# Patient Record
Sex: FEMALE | Race: WHITE | NOT HISPANIC OR LATINO | URBAN - METROPOLITAN AREA
[De-identification: names, ages, dates, MRNs, and addresses within clinical notes are randomized per-mention and may not be internally consistent; named-entity substitution may affect disease eponyms.]

---

## 2018-06-13 ENCOUNTER — TELEPHONE (OUTPATIENT)
Dept: SOCIAL WORK | Facility: HOSPITAL | Age: 48
End: 2018-06-13

## 2018-06-13 NOTE — TELEPHONE ENCOUNTER
Dr. Florian received a hard copy From [facility] for a refill order on patients oxygen. Original order date was 5/15/2014. Informed Hafsa MCKEON from [facility] that our acute inpatient hosptialists do not order yearly refills on Oxygen orders & that they need to get this from pt's PCP or Pulmonologist. (looked in old records & could not find PCP listed.) She stated will make note of this.